# Patient Record
Sex: FEMALE | Race: BLACK OR AFRICAN AMERICAN | Employment: FULL TIME | URBAN - METROPOLITAN AREA
[De-identification: names, ages, dates, MRNs, and addresses within clinical notes are randomized per-mention and may not be internally consistent; named-entity substitution may affect disease eponyms.]

---

## 2022-08-25 ENCOUNTER — HOSPITAL ENCOUNTER (EMERGENCY)
Age: 57
Discharge: HOME | End: 2022-08-25
Attending: EMERGENCY MEDICINE
Payer: COMMERCIAL

## 2022-08-25 VITALS
TEMPERATURE: 98 F | DIASTOLIC BLOOD PRESSURE: 112 MMHG | OXYGEN SATURATION: 100 % | RESPIRATION RATE: 20 BRPM | HEART RATE: 81 BPM | SYSTOLIC BLOOD PRESSURE: 174 MMHG

## 2022-08-25 DIAGNOSIS — I10 HYPERTENSION, UNSPECIFIED TYPE: Primary | ICD-10-CM

## 2022-08-25 PROCEDURE — 93005 ELECTROCARDIOGRAM TRACING: CPT | Performed by: EMERGENCY MEDICINE

## 2022-08-25 PROCEDURE — 6370000000 HC RX 637 (ALT 250 FOR IP): Performed by: EMERGENCY MEDICINE

## 2022-08-25 PROCEDURE — 99283 EMERGENCY DEPT VISIT LOW MDM: CPT

## 2022-08-25 RX ORDER — LISINOPRIL 10 MG/1
10 TABLET ORAL DAILY
Qty: 30 TABLET | Refills: 0 | Status: SHIPPED | OUTPATIENT
Start: 2022-08-25

## 2022-08-25 RX ORDER — AMLODIPINE BESYLATE 5 MG/1
5 TABLET ORAL DAILY
Qty: 30 TABLET | Refills: 0 | Status: SHIPPED | OUTPATIENT
Start: 2022-08-25

## 2022-08-25 RX ORDER — LISINOPRIL 10 MG/1
10 TABLET ORAL ONCE
Status: COMPLETED | OUTPATIENT
Start: 2022-08-25 | End: 2022-08-25

## 2022-08-25 RX ORDER — AMLODIPINE BESYLATE 5 MG/1
5 TABLET ORAL ONCE
Status: COMPLETED | OUTPATIENT
Start: 2022-08-25 | End: 2022-08-25

## 2022-08-25 RX ADMIN — LISINOPRIL 10 MG: 10 TABLET ORAL at 19:24

## 2022-08-25 RX ADMIN — AMLODIPINE BESYLATE 5 MG: 5 TABLET ORAL at 19:24

## 2022-08-25 NOTE — ED NOTES
Discharge and education instructions reviewed. Patient verbalized understanding, teach-back successful. Patient denied questions at this time. No acute distress noted. Patient instructed to follow-up as noted - return to emergency department if symptoms worsen. Patient verbalized understanding. Discharged per EDMD with discharged instructions.      Ike Castro RN  08/25/22 [id number]

## 2022-08-26 LAB
EKG ATRIAL RATE: 76 BPM
EKG DIAGNOSIS: NORMAL
EKG P AXIS: -10 DEGREES
EKG P-R INTERVAL: 132 MS
EKG Q-T INTERVAL: 406 MS
EKG QRS DURATION: 88 MS
EKG QTC CALCULATION (BAZETT): 456 MS
EKG R AXIS: 16 DEGREES
EKG T AXIS: 17 DEGREES
EKG VENTRICULAR RATE: 76 BPM

## 2022-08-26 PROCEDURE — 93010 ELECTROCARDIOGRAM REPORT: CPT | Performed by: INTERNAL MEDICINE

## 2022-08-26 NOTE — ED PROVIDER NOTES
[address] [facility] PROVIDER NOTE    Patient Identification  Pt Name: Naresh Shrestha  MRN: [id number]  Elaynegfkash 1965  Date of evaluation: 8/25/2022  Provider: Ashley Quintanilla MD  PCP: Alta Flores MD    Chief Complaint  Hypertension (Patient in with complaints of increase in bp for a few days. States its been running 170s/110s)      HPI  (History provided by patient)  This is a 64 y.o. female who was brought in by self for elevated blood pressure. Patient's blood pressures been in the 170s over 110s. Despite this, patient denies numbness, tingling, focal weakness, headaches, loss of vision or change in vision, change in urination, and other symptoms. She has not had cough or fever. She feels well and has no other complaints. She is not currently on blood pressure medication    ROS  10 systems reviewed, pertinent positives/negatives per HPI otherwise noted to be negative. I have reviewed the following nursing documentation:  Allergies: Patient has no known allergies. Past medical history: No past medical history  Past surgical history: No past surgical history on file. Home medications:   Discharge Medication List as of 8/25/2022  7:20 PM          Social history:  reports that she has never smoked. She has never been exposed to tobacco smoke. She has never used smokeless tobacco. She reports that she does not drink alcohol and does not use drugs. Family history:  No family history      Exam  ED Triage Vitals [08/25/22 1838]   BP Temp Temp Source Heart Rate Resp SpO2 Height Weight   (!) 174/112 98 °F (36.7 °C) Oral 81 20 100 % -- --     Nursing note and vitals reviewed. Constitutional: Well developed, well nourished. Non-toxic in appearance. HENT:      Head: Normocephalic and atraumatic. Ears: External ears normal.      Nose: Nose normal.     Mouth: Membrane mucosa moist and pink. Eyes: Anicteric sclera. No discharge. Neck: Supple. Trachea midline. Cardiovascular: RRR; no murmurs, rubs, or gallops. Pulmonary/Chest: Effort normal. No respiratory distress. CTAB. No stridor. No wheezes. No rales. Abdominal: Soft. No distension. Non-tender to palpation  Musculoskeletal: Moves all extremities. No gross deformity. No lower extremity edema. Neurological: Alert and oriented. Face symmetric. Speech is clear. Skin: Warm and dry. No rash. Psychiatric: Normal mood and affect. Behavior is normal.    EKG  The Ekg interpreted by me in the absence of a cardiologist shows. normal sinus rhythm with a rate of 76  Axis is   Normal  QTc is  normal  Intervals and Durations are unremarkable. No specific ST-T wave changes appreciated. No evidence of acute ischemia. No previous EKGs available for comparison    Labs  Results for orders placed or performed during the hospital encounter of 08/25/22   EKG 12 Lead   Result Value Ref Range    Ventricular Rate 76 BPM    Atrial Rate 76 BPM    P-R Interval 132 ms    QRS Duration 88 ms    Q-T Interval 406 ms    QTc Calculation (Bazett) 456 ms    P Axis -10 degrees    R Axis 16 degrees    T Axis 17 degrees    Diagnosis       Normal sinus rhythmConfirmed by Alex Meraz ([id number]) on 8/26/2022 5:36:53 PM       MDM and ED Course  Patient presented today with asymptomatic hypertension. I suspect she has been hypertensive for some time. It is reassuring that she has had no signs or symptoms of more serious hypertensive disease. After reviewing the literature, given the significant elevation of the patient's blood pressure, I am going to treat empirically with 2 medications at lower doses.   I have instructed the patient on a blood pressure diary and advised her to keep track of this diligently until she follows up with her primary care physician    I estimate there is LOW risk for ACUTE CORONARY SYNDROME, INTRACRANIAL HEMORRHAGE, MALIGNANT DYSRHYTHMIA, MALIGNANT HYPERTENSION, PULMONARY EMBOLISM, SEPSIS, SUBARACHNOID HEMORRHAGE, SUBDURAL HEMATOMA, STROKE, or THORACIC AORTIC DISSECTION, thus I consider the discharge disposition reasonable. Beti Maxwell and I have discussed the diagnosis and risks, and we agree with discharging home to follow-up with their primary doctor. We also discussed returning to the Emergency Department immediately if new or worsening symptoms occur. We have discussed the symptoms which are most concerning (e.g., bloody sputum, fever, worsening pain or shortness of breath, vomiting, weakness, sweating) that necessitate immediate return. Final Impression  1. Hypertension, unspecified type        Blood pressure (!) 174/112, pulse 81, temperature 98 °F (36.7 °C), temperature source Oral, resp. rate 20, SpO2 100 %. Disposition:  DISPOSITION Decision To Discharge 08/25/2022 07:09:32 PM      Patient Referrals:  [facility] Emergency Department  [address] [facility]  [phone]    As needed, If symptoms worsen or new symptoms develop    Jeff Burgos MD  [facility]  [address] [place] [address]  [phone]    On 9/19/2022  as scheduled      Discharge Medications:  Discharge Medication List as of 8/25/2022  7:20 PM        START taking these medications    Details   lisinopril (PRINIVIL;ZESTRIL) 10 MG tablet Take 1 tablet by mouth daily, Disp-30 tablet, R-0Normal      amLODIPine (NORVASC) 5 MG tablet Take 1 tablet by mouth daily, Disp-30 tablet, R-0Normal             This chart was generated using the [employer] dictation system. I created this record but it may contain dictation errors given the limitations of this technology.         Negrito Wilson MD  09/08/22 [id number]